# Patient Record
Sex: MALE | URBAN - METROPOLITAN AREA
[De-identification: names, ages, dates, MRNs, and addresses within clinical notes are randomized per-mention and may not be internally consistent; named-entity substitution may affect disease eponyms.]

---

## 2020-09-24 ENCOUNTER — TREATMENT (OUTPATIENT)
Dept: PHYSICAL THERAPY | Facility: CLINIC | Age: 65
End: 2020-09-24

## 2020-09-24 DIAGNOSIS — I63.9 CEREBROVASCULAR ACCIDENT (CVA), UNSPECIFIED MECHANISM (HCC): Primary | ICD-10-CM

## 2020-09-24 PROCEDURE — 97163 PT EVAL HIGH COMPLEX 45 MIN: CPT | Performed by: PHYSICAL THERAPIST

## 2020-09-24 NOTE — PROGRESS NOTES
Physical Therapy Initial Evaluation and Plan of Care        Patient: Rico Calhoun   : 1955  Diagnosis/ICD-10 Code:  Cerebrovascular accident (CVA), unspecified mechanism (CMS/[facility]) [I63.9]  Referring practitioner: Vimal Matos MD  Date of Initial Visit: 2020  Today's Date: 2020  Patient seen for 1 sessions           Subjective Evaluation    History of Present Illness  Date of onset: 2020  Mechanism of injury: Under going an open heart surgery at [facility], experienced a CVA during the operation.  Afterwards had significant memory issues and cognitive issue, dysfunction of the right UE and LE.  Was in [facility] until 2020.  Home Health Nursing is coming out 1x/week for 4 weeks, no other services at this time for home health.  Right calf/foot has had issues since a DVT 4 years ago, he has a Fem-Fem.    CURRENT COMPLAINTS  He can feel his gait is abnormal, balance is abnormal, unsure how is with right UE. Both the right UE and LE are improving.      Patient Occupation: Artist Quality of life: good    Pain  No pain reported    Social Support  Lives in: multiple-level home  Lives with: significant other    Hand dominance: right    Treatments  No previous or current treatments           Objective          Neurological Testing     Reflexes   Left   Biceps (C5/C6): normal (2+)  Brachioradialis (C6): normal (2+)  Triceps (C7): normal (2+)  Patellar (L4): brisk (3+)  Achilles (S1): brisk (3+)    Right   Biceps (C5/C6): brisk (3+)  Brachioradialis (C6): brisk (3+)  Triceps (C7): normal (2+)  Patellar (L4): brisk (3+)  Achilles (S1): brisk (3+)    Active Range of Motion   Left Shoulder   Normal active range of motion    Right Shoulder   Normal active range of motion    Lumbar   Normal active range of motion    Strength/Myotome Testing     Left Hip   Planes of Motion   Flexion: 5    Right Hip   Planes of Motion   Flexion: 4+  Abduction: 4+    Left Knee   Flexion: 5  Extension: 5    Right Knee    Flexion: 5  Extension: 5    Left Ankle/Foot   Dorsiflexion: 5  Eversion: 5  Great toe extension: 5    Right Ankle/Foot   Dorsiflexion: 5  Eversion: 5  Great toe extension: 5    Ambulation   Weight-Bearing Status   Weight-Bearing Status (Left): full weight bearing   Weight-Bearing Status (Right): full weight-bearing   Assistive device used: none    Ambulation: Level Surfaces   Ambulation without assistive device: contact guard assist    Ambulation: Stairs   Ascend stairs: contact guard assist  Pattern: reciprocal  Railings: two rails  Descend stairs: contact guard assist  Pattern: reciprocal  Railings: two rails    Observational Gait   Gait: vaulting   Stride length within functional limits. Decreased walking speed.   Left foot contact pattern: toe to heel  Right foot contact pattern: toe to heel  Left arm swing: within functional limits  Right arm swing: high guard  Base of support: increased    Quality of Movement During Gait   Trunk  Trunk within functional limits.     Functional Assessment     Single Leg Stance - Eyes Open   Left  Trial 1: 5 seconds  Trial 2: 4 seconds  Trial 3: 8 seconds  Average: 5.67 seconds     Right  Trial 1: 10 seconds  Trial 2: 11 seconds  Trial 3: 12 seconds  Average: 11 seconds     Comments  Stepping Fukuda=forward Translation 3 feet.  Increased tone in right hand with this activity.  Dynamic Gait Index=18/24 Indication of fall risks.      General Comments     Wrist/Hand Comments  Cerebellar Testing: Diadochokinesia=Abnormal    PURDUE PEG BOARD TESTING                 Trial 1   Trial 2    Trial 3           Average  Left=         10          10          9                    9.66  Right=        9            8           8                   8.66  Both=         5            3           4                   4  Assembly= 11         15         15                  13.66    Comment: very low scores, indicating significant loss in fine motor skills         Assessment & Plan      Assessment  Impairments: abnormal coordination, abnormal gait and impaired balance  Assessment details: 64 year old male who experienced a CVA during a surgical procedure on 9/8/2020.  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  He has abnormal gait and poor balance, considered a fall risk.  Prognosis: good  Functional Limitations: unable to perform repetitive tasks  Goals  Plan Goals: STG to be met in 4 weeks  1.  Improved gait with normal arm swing.  2.  Decreased fall risk with improved DGI score to 20/24.  3.  Fine motor control improved with improve Assembly score on Purdue Peg Board to 18.  LTG to be met in 12 weeks  1.  Pt is independent with each phase of HEP.  2.  Pt is no long considered a fall risk, with DGI score of 23/24.  3.  Improved Fine motor skills with an Assembly score of 24 or greater on Purdue Peg Board.    Plan  Therapy options: will be seen for skilled physical therapy services  Planned therapy interventions: motor coordination training, neuromuscular re-education, abdominal trunk stabilization, ADL retraining, balance/weight-bearing training, gait training, strengthening, postural training and therapeutic activities  Frequency: 2x week  Duration in weeks: 12            Total Treatment:     45   mins    PT SIGNATURE: Lucas Escamilla PT   DATE TREATMENT INITIATED: 9/24/2020    Initial Certification  Certification Period: 12/23/2020  I certify that the therapy services are furnished while this patient is under my care.  The services outlined above are required by this patient, and will be reviewed every 90 days.     PHYSICIAN: ___________________________________________     Vimal Matos MD        DATE: ________________________________________________    Please sign and return via fax to [phone].. Thank you, [facility]  Physical Therapy.

## 2020-09-29 ENCOUNTER — TREATMENT (OUTPATIENT)
Dept: PHYSICAL THERAPY | Facility: CLINIC | Age: 65
End: 2020-09-29

## 2020-09-29 DIAGNOSIS — I63.9 CEREBROVASCULAR ACCIDENT (CVA), UNSPECIFIED MECHANISM (HCC): Primary | ICD-10-CM

## 2020-09-29 PROCEDURE — 97110 THERAPEUTIC EXERCISES: CPT | Performed by: PHYSICAL THERAPIST

## 2020-10-02 ENCOUNTER — TREATMENT (OUTPATIENT)
Dept: PHYSICAL THERAPY | Facility: CLINIC | Age: 65
End: 2020-10-02

## 2020-10-02 PROCEDURE — 97530 THERAPEUTIC ACTIVITIES: CPT | Performed by: PHYSICAL THERAPIST

## 2020-10-02 PROCEDURE — 97112 NEUROMUSCULAR REEDUCATION: CPT | Performed by: PHYSICAL THERAPIST

## 2020-10-02 PROCEDURE — 97110 THERAPEUTIC EXERCISES: CPT | Performed by: PHYSICAL THERAPIST

## 2020-10-02 NOTE — PROGRESS NOTES
Physical Therapy Daily Progress Note    Patient: Rico Calhoun   : 1955  Diagnosis/ICD-10 Code:  No primary diagnosis found.  Referring practitioner: Vimal Matos MD  Date of Initial Visit: Type: THERAPY  Noted: 2020  Today's Date: 10/2/2020  Patient seen for 3 sessions         Rico Calhoun reports slowly noticing more strength his right leg.  Walking his dog helps with this.  Is not sleeping well due to anxiety from worrying about his mother's health and recovering from his own health issues.  Denies pain in arm and leg.      Subjective     Objective   See Exercise, Manual, and Modality Logs for complete treatment.   *GHJ flx AROM->170 deg  *GHJ ER AROM->60 deg  *GHJ ER MMT->5  *GHJ 90 deg scaption MMT->4+ with R GHJ p!  *(+) Belly Press for p! In R GHJ, no weakness  *Elbow flx AROM->130 deg  *Elbow ext AROM->0 deg  *Elbow pro/sup AROM->85 deg each direction    Assessment/Plan  Has no gross or fine motor deficits of the R hand with AROM of all fingers touching 1st digit to digits 2-5 without difficulty.  Has no impairments with light touch sensation testing to all parts of hand.  Is able to perform AROM elbow sup/pro and AROM 1st digit touching digits 2-5 with EC.  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       Manual Therapy:         mins  40897;  Therapeutic Exercise:   30     mins  87307;     Neuromuscular Shanae:    10    mins  06746;    Therapeutic Activity:     15     mins  60726;     Gait Training:           mins  98986;     Ultrasound:          mins  41715;    Electrical Stimulation:         mins  19849 ( );  Dry Needling          mins self-pay    Timed Treatment:   55   mins   Total Treatment:     55   mins    Vimal Young PT  Physical Therapist

## 2020-10-06 ENCOUNTER — TREATMENT (OUTPATIENT)
Dept: PHYSICAL THERAPY | Facility: CLINIC | Age: 65
End: 2020-10-06

## 2020-10-06 DIAGNOSIS — I63.9 CEREBROVASCULAR ACCIDENT (CVA), UNSPECIFIED MECHANISM (HCC): Primary | ICD-10-CM

## 2020-10-06 PROCEDURE — 97112 NEUROMUSCULAR REEDUCATION: CPT | Performed by: PHYSICAL THERAPIST

## 2020-10-06 PROCEDURE — 97110 THERAPEUTIC EXERCISES: CPT | Performed by: PHYSICAL THERAPIST

## 2020-10-06 PROCEDURE — 97530 THERAPEUTIC ACTIVITIES: CPT | Performed by: PHYSICAL THERAPIST

## 2020-10-07 NOTE — PROGRESS NOTES
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 Gait still feels off some.    Objective   No loss of balance but pt has to constantly concentrate on each movement.  See Exercise, Manual, and Modality Logs for complete treatment.       Assessment/Plan  Work on gross strengthening activities and balancing activities. He tolerated well.  Can still some some mild coordination deficits.    Progress per Plan of Care and Progress strengthening /stabilization /functional activity             Timed Treatment:   53   mins   Total Treatment:     53   mins    Lucas Escamilla, PT  Physical Therapist

## 2020-10-09 ENCOUNTER — TELEPHONE (OUTPATIENT)
Dept: PHYSICAL THERAPY | Facility: CLINIC | Age: 65
End: 2020-10-09

## 2020-10-09 NOTE — TELEPHONE ENCOUNTER
Patient called same day to cancel appointment due to not having transportation. He is keeping his future appointments and will reschedule today's at them end of his visits.

## 2020-10-14 ENCOUNTER — TREATMENT (OUTPATIENT)
Dept: PHYSICAL THERAPY | Facility: CLINIC | Age: 65
End: 2020-10-14

## 2020-10-14 DIAGNOSIS — I63.9 CEREBROVASCULAR ACCIDENT (CVA), UNSPECIFIED MECHANISM (HCC): Primary | ICD-10-CM

## 2020-10-14 PROCEDURE — 97112 NEUROMUSCULAR REEDUCATION: CPT | Performed by: PHYSICAL THERAPIST

## 2020-10-14 PROCEDURE — 97110 THERAPEUTIC EXERCISES: CPT | Performed by: PHYSICAL THERAPIST

## 2020-10-14 PROCEDURE — 97530 THERAPEUTIC ACTIVITIES: CPT | Performed by: PHYSICAL THERAPIST

## 2020-10-16 ENCOUNTER — TREATMENT (OUTPATIENT)
Dept: PHYSICAL THERAPY | Facility: CLINIC | Age: 65
End: 2020-10-16

## 2020-10-16 DIAGNOSIS — R29.898 MUSCULAR DECONDITIONING: Primary | ICD-10-CM

## 2020-10-16 PROCEDURE — 97112 NEUROMUSCULAR REEDUCATION: CPT | Performed by: PHYSICAL THERAPIST

## 2020-10-16 PROCEDURE — 97530 THERAPEUTIC ACTIVITIES: CPT | Performed by: PHYSICAL THERAPIST

## 2020-10-16 PROCEDURE — 97110 THERAPEUTIC EXERCISES: CPT | Performed by: PHYSICAL THERAPIST

## 2020-10-16 NOTE — PROGRESS NOTES
Physical Therapy Daily Progress Not    Patient: Rico Calhoun   : 1955  Diagnosis/ICD-10 Code:  No primary diagnosis found.  Referring practitioner: Vimal Matos MD  Date of Initial Visit: No linked episodes  Today's Date: 10/16/2020  Patient seen for Visit count could not be calculated. Make sure you are using a visit which is associated with an episode. sessions         Rico Calhoun reports sleeping the best has has slept since his surgery, last night.  Was sore and tired after his last visit, but that felt good.    Subjective     Objective   See Exercise, Manual, and Modality Logs for complete treatment.   *Pre-visit BP/HR/O2:  145/87; 78; 99%  *Post-visit BP/HR/02:  134/89; 80; 97%    Assessment/Plan  Focused on improving general balance, hip mm activation and general LE mm strength and endurance.      Manual Therapy:                 mins  45168;  Therapeutic Exercise:    30     mins  54120;     Neuromuscular Shanae:   13     mins  09752;    Therapeutic Activity:      10     mins  19556;     Gait Training:                      mins  73796;     Ultrasound:                          mins  20055;    Electrical Stimulation:         mins  77408 ( );  Dry Needling                       mins self-pay    Timed Treatment:   53   mins   Total Treatment:     53   mins    Vimal Young PT  Physical Therapist

## 2020-10-19 NOTE — PROGRESS NOTES
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    Objective   OBSERVATION/GAIT: slight inversion of right foot during swing through phase of gait.    BALANCE: left single leg good -, right leg fair.  See Exercise, Manual, and Modality Logs for complete treatment.       Assessment/Plan  Still some mild neurological abnormalities in the right LE, it is improving from week to week.    Progress per Plan of Care and Progress strengthening /stabilization /functional activity           Manual Therapy:         mins  30281;  Therapeutic Exercise:    23     mins  38349;     Neuromuscular Shanae:    16    mins  03182;    Therapeutic Activity:     15     mins  30885;     Gait Training:           mins  21112;     Ultrasound:          mins  73852;    Electrical Stimulation:         mins  07228 ( );  Iontophoresis          mins 65092   Traction          mins  42743  Fluidotherapy          mins  24147  Dry Needling          mins self-pay  Paraffin          mins  43519    Timed Treatment:   54   mins   Total Treatment:     54   mins    Lucas Escamilla PT  Physical Therapist

## 2020-10-21 ENCOUNTER — TREATMENT (OUTPATIENT)
Dept: PHYSICAL THERAPY | Facility: CLINIC | Age: 65
End: 2020-10-21

## 2020-10-21 DIAGNOSIS — R29.898 MUSCULAR DECONDITIONING: Primary | ICD-10-CM

## 2020-10-21 PROCEDURE — 97112 NEUROMUSCULAR REEDUCATION: CPT | Performed by: PHYSICAL THERAPIST

## 2020-10-21 PROCEDURE — 97110 THERAPEUTIC EXERCISES: CPT | Performed by: PHYSICAL THERAPIST

## 2020-10-21 PROCEDURE — 97530 THERAPEUTIC ACTIVITIES: CPT | Performed by: PHYSICAL THERAPIST

## 2020-10-21 NOTE — PROGRESS NOTES
Physical Therapy Daily Progress Note    Patient: Rico Calhoun   : 1955  Diagnosis/ICD-10 Code:  Muscular deconditioning [R29.898]  Referring practitioner: Vimal Matos MD  Date of Initial Visit: No linked episodes  Today's Date: 10/21/2020  Patient seen for Visit count could not be calculated. Make sure you are using a visit which is associated with an episode. sessions         Rico Calhoun reports that he was a little sore in his hips after last visit.  Drove today, and felt confident driving, just had to adjust to use of the right leg when braking since he has not driven in so long.    Subjective     Objective   See Exercise, Manual, and Modality Logs for complete treatment.   *Pre HR/BP/02:  85; 120/69; 98%  *Post HR/BP/02:  85; 120/85; 98%    Assessment/Plan  Continued emphasis on improving general balance, CV endurance and LQ mm strength.         Manual Therapy:         mins  65852;  Therapeutic Exercise:    25     mins  76032;     Neuromuscular Shanae:    15    mins  98737;    Therapeutic Activity:     13     mins  40159;     Gait Training:           mins  97662;     Ultrasound:          mins  75604;    Electrical Stimulation:         mins  27790 ( );  Dry Needling          mins self-pay    Timed Treatment:   53   mins   Total Treatment:     53   mins    Vimal Young PT  Physical Therapist

## 2020-10-23 ENCOUNTER — TREATMENT (OUTPATIENT)
Dept: PHYSICAL THERAPY | Facility: CLINIC | Age: 65
End: 2020-10-23

## 2020-10-23 DIAGNOSIS — R29.898 MUSCULAR DECONDITIONING: Primary | ICD-10-CM

## 2020-10-23 PROCEDURE — 97112 NEUROMUSCULAR REEDUCATION: CPT | Performed by: PHYSICAL THERAPIST

## 2020-10-23 PROCEDURE — 97110 THERAPEUTIC EXERCISES: CPT | Performed by: PHYSICAL THERAPIST

## 2020-10-23 PROCEDURE — 97530 THERAPEUTIC ACTIVITIES: CPT | Performed by: PHYSICAL THERAPIST

## 2020-10-23 NOTE — PROGRESS NOTES
Physical Therapy Daily Progress Note    Patient: Rico Calhoun   : 1955  Diagnosis/ICD-10 Code:  Muscular deconditioning [R29.898]  Referring practitioner: Vimal Matos MD  Date of Initial Visit: Type: THERAPY  Noted: 2020  Today's Date: 10/23/2020  Patient seen for 7 sessions         Rico Calhoun reports some muscle soreness in his thighs since last visit.  Otherwise, feels good today.      Subjective     Objective   See Exercise, Manual, and Modality Logs for complete treatment.   *Pre HR/BP/02:  70; 146/87; 98%  *Post HR/BP/02:  75; 150/92; 98%    Assessment/Plan  Continued emphasis on improving LQ strength, general balance and CV endurance.         Manual Therapy:         mins  47450;  Therapeutic Exercise:    15     mins  03541;     Neuromuscular Shanae:    15    mins  86193;    Therapeutic Activity:     10     mins  38153;     Gait Training:           mins  57769;     Ultrasound:          mins  11610;    Electrical Stimulation:         mins  33789 ( );  Dry Needling          mins self-pay    Timed Treatment:   40   mins   Total Treatment:     40   mins    Vimal Young PT  Physical Therapist

## 2020-10-26 ENCOUNTER — TREATMENT (OUTPATIENT)
Dept: PHYSICAL THERAPY | Facility: CLINIC | Age: 65
End: 2020-10-26

## 2020-10-26 DIAGNOSIS — R29.898 MUSCULAR DECONDITIONING: Primary | ICD-10-CM

## 2020-10-26 PROCEDURE — 97110 THERAPEUTIC EXERCISES: CPT | Performed by: PHYSICAL THERAPIST

## 2020-10-26 PROCEDURE — 97112 NEUROMUSCULAR REEDUCATION: CPT | Performed by: PHYSICAL THERAPIST

## 2020-10-26 NOTE — PROGRESS NOTES
Physical Therapy Daily Progress Note    Patient: Rico Calhoun   : 1955  Diagnosis/ICD-10 Code:  No primary diagnosis found.  Referring practitioner: Vimal Matos MD  Date of Initial Visit: No linked episodes  Today's Date: 10/26/2020  Patient seen for Visit count could not be calculated. Make sure you are using a visit which is associated with an episode. sessions         Rico Calhoun reports some muscle soreness in his legs after last visit, but nothing significant.  Reports feeling some dizziness with changes in direction or head turns.      Subjective     Objective   See Exercise, Manual, and Modality Logs for complete treatment.   *HR:  85-90 throughout visit  *O2%:  98-99% throughout visit    Assessment/Plan  Continued emphasis on improving general balance and LE mm endurance.  Added ball tossing with trunk rotation to improve equilibrium.       Manual Therapy:         mins  65510;  Therapeutic Exercise:    15     mins  85188;     Neuromuscular Shanae:    15    mins  58204;    Therapeutic Activity:          mins  30529;     Gait Training:           mins  33071;     Ultrasound:          mins  68484;    Electrical Stimulation:         mins  21969 ( );  Dry Needling          mins self-pay    Timed Treatment:   30   mins   Total Treatment:     30   mins    Vimal Young PT  Physical Therapist

## 2020-10-30 ENCOUNTER — TREATMENT (OUTPATIENT)
Dept: PHYSICAL THERAPY | Facility: CLINIC | Age: 65
End: 2020-10-30

## 2020-10-30 DIAGNOSIS — R26.9 GAIT DISTURBANCE: Primary | ICD-10-CM

## 2020-10-30 PROCEDURE — 97110 THERAPEUTIC EXERCISES: CPT | Performed by: PHYSICAL THERAPIST

## 2020-10-30 PROCEDURE — 97530 THERAPEUTIC ACTIVITIES: CPT | Performed by: PHYSICAL THERAPIST

## 2020-10-30 PROCEDURE — 97112 NEUROMUSCULAR REEDUCATION: CPT | Performed by: PHYSICAL THERAPIST

## 2020-10-30 NOTE — PROGRESS NOTES
Physical Therapy Daily Progress Note    Patient: Rico Calhoun   : 1955  Diagnosis/ICD-10 Code:  Gait disturbance [R26.9]  Referring practitioner: Vimal Matos MD  Date of Initial Visit: Type: THERAPY  Noted: 2020  Today's Date: 10/30/2020  Patient seen for 9 sessions         Rico Calhoun reports that he is feeling more steady when walking since starting physical therapy.  But, has noticed a sense of mild dizziness with sudden changes in direction while walking, or turning his head quickly.  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    Subjective     Objective   See Exercise, Manual, and Modality Logs for complete treatment.   *DGI (IE):    *DGI (10/30/20):   (KAMRYN improvement x 1)  *Knee extension MMT:  5/5 B/L  *Hip aBd MMT:  4/5 B/L  *Post 10 min on bike HR/pulse:  138/87; 78    Assessment/Plan  Mr. Calhoun has attended physical therapy for a total of 10 visits s/p CVA affecting gait and balance.  Focus of physical therapy has been to improve general LQ mm endurance and strength.  Combined with improving general balance.  Needs handrail assist x 1 when ascending/descending one flight of stairs.  And, has mild gait disturbance when looking up while ambulating.         Manual Therapy:         mins  84872;  Therapeutic Exercise:    30     mins  82131;     Neuromuscular Shanae:    10    mins  85562;    Therapeutic Activity:     15     mins  00952;     Gait Training:           mins  53616;     Ultrasound:          mins  08373;    Electrical Stimulation:         mins  70993 ( );  Dry Needling          mins self-pay    Timed Treatment:   55   mins   Total Treatment:     55   mins    Vimal Young, PT  Physical Therapist

## 2020-11-04 ENCOUNTER — TREATMENT (OUTPATIENT)
Dept: PHYSICAL THERAPY | Facility: CLINIC | Age: 65
End: 2020-11-04

## 2020-11-04 DIAGNOSIS — I63.9 CEREBROVASCULAR ACCIDENT (CVA), UNSPECIFIED MECHANISM (HCC): ICD-10-CM

## 2020-11-04 DIAGNOSIS — R26.9 GAIT DISTURBANCE: Primary | ICD-10-CM

## 2020-11-04 DIAGNOSIS — R29.898 MUSCULAR DECONDITIONING: ICD-10-CM

## 2020-11-04 PROCEDURE — 97110 THERAPEUTIC EXERCISES: CPT | Performed by: PHYSICAL THERAPIST

## 2020-11-04 PROCEDURE — 97112 NEUROMUSCULAR REEDUCATION: CPT | Performed by: PHYSICAL THERAPIST

## 2020-11-04 PROCEDURE — 97530 THERAPEUTIC ACTIVITIES: CPT | Performed by: PHYSICAL THERAPIST

## 2020-11-09 NOTE — PROGRESS NOTES
Physical Therapy Daily Progress Note        Patient: Rico Calhoun   : 1955  Diagnosis/ICD-10 Code:  Gait disturbance [R26.9]  Referring practitioner: Vimal Matos MD  Date of Initial Visit: Type: THERAPY  Noted: 2020  Today's Date: 2020  Patient seen for 10 sessions           Subjective   Rico Calhoun reports: quick changes of direction, feels dizzy at times, not as severe. 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    Objective   GAIT: no assistive devices,  Limited trunk rotation  BALANCE: Single leg standing fair.    See Exercise, Manual, and Modality Logs for complete treatment.       Assessment/Plan  He is improving.  Can see strength, coordination, and fine motor improvements week to week.  Progress per Plan of Care and Progress strengthening /stabilization /functional activity           Manual Therapy:         mins  90430;  Therapeutic Exercise:    30     mins  15471;     Neuromuscular Shanae:    10    mins  52813;    Therapeutic Activity:     15     mins  08135;     Gait Training:           mins  68146;     Ultrasound:          mins  52968;    Electrical Stimulation:         mins  99073 ( );  Iontophoresis          mins 71670   Traction          mins  95442  Fluidotherapy          mins  19738  Dry Needling          mins self-pay  Paraffin          mins  30140    Timed Treatment:   55   mins   Total Treatment:     55   mins    Lucas Escamilla PT  Physical Therapist

## 2020-11-12 ENCOUNTER — TREATMENT (OUTPATIENT)
Dept: PHYSICAL THERAPY | Facility: CLINIC | Age: 65
End: 2020-11-12

## 2020-11-12 DIAGNOSIS — R29.898 MUSCULAR DECONDITIONING: ICD-10-CM

## 2020-11-12 DIAGNOSIS — R26.9 GAIT DISTURBANCE: Primary | ICD-10-CM

## 2020-11-12 DIAGNOSIS — I63.9 CEREBROVASCULAR ACCIDENT (CVA), UNSPECIFIED MECHANISM (HCC): ICD-10-CM

## 2020-11-12 PROCEDURE — 97530 THERAPEUTIC ACTIVITIES: CPT | Performed by: PHYSICAL THERAPIST

## 2020-11-12 PROCEDURE — 97112 NEUROMUSCULAR REEDUCATION: CPT | Performed by: PHYSICAL THERAPIST

## 2020-11-12 PROCEDURE — 97110 THERAPEUTIC EXERCISES: CPT | Performed by: PHYSICAL THERAPIST

## 2020-11-13 NOTE — PROGRESS NOTES
Physical Therapy Daily Progress Note        Patient: Rico Calhoun   : 1955  Diagnosis/ICD-10 Code:  Gait disturbance [R26.9]  Referring practitioner: Vimal Matos MD  Date of Initial Visit: Type: THERAPY  Noted: 2020  Today's Date: 2020  Patient seen for 11 sessions           Subjective   Rico Calhoun reports: rehab is helping.  He is walking dog with greater ease. No recent falls or near falls.  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    Objective   GAIT: No AD. Step length appears to equal.  BALANCE: left LE good, right LE fair + dynamically.  See Exercise, Manual, and Modality Logs for complete treatment.       Assessment/Plan  If his right LE strength improves a bit more his balance and function should be safe to do all he needs to do in daily life.  Progress per Plan of Care and Progress strengthening /stabilization /functional activity           Manual Therapy:         mins  90971;  Therapeutic Exercise:    25     mins  16388;     Neuromuscular Shanae:    13    mins  96936;    Therapeutic Activity:     20     mins  97131;     Gait Training:           mins  33313;     Ultrasound:          mins  58921;    Electrical Stimulation:         mins  32485 ( );  Iontophoresis          mins 74730   Traction          mins  10080  Fluidotherapy          mins  11763  Dry Needling          mins self-pay  Paraffin          mins  43134    Timed Treatment:   58   mins   Total Treatment:     58   mins    Lucas Escamilla PT  Physical Therapist

## 2020-11-19 ENCOUNTER — TELEPHONE (OUTPATIENT)
Dept: ORTHOPEDICS | Facility: OTHER | Age: 65
End: 2020-11-19